# Patient Record
Sex: FEMALE | Race: WHITE | NOT HISPANIC OR LATINO | URBAN - NONMETROPOLITAN AREA
[De-identification: names, ages, dates, MRNs, and addresses within clinical notes are randomized per-mention and may not be internally consistent; named-entity substitution may affect disease eponyms.]

---

## 2019-02-08 ENCOUNTER — IMPORTED ENCOUNTER (OUTPATIENT)
Dept: URBAN - NONMETROPOLITAN AREA CLINIC 1 | Facility: CLINIC | Age: 64
End: 2019-02-08

## 2019-02-08 PROBLEM — H52.03: Noted: 2018-02-19

## 2019-02-08 PROBLEM — H52.4: Noted: 2018-02-19

## 2019-02-08 PROBLEM — H25.813: Noted: 2019-02-08

## 2019-02-08 PROBLEM — E11.9: Noted: 2018-02-19

## 2019-02-08 PROCEDURE — 99214 OFFICE O/P EST MOD 30 MIN: CPT

## 2019-02-08 NOTE — PATIENT DISCUSSION
Cataract(s)-Visually significant cataract OU.-Cataract(s) causing symptomatic impairment of visual function not correctable with a tolerable change in glasses or contact lenses lighting or non-operative means resulting in specific activity limitations and/or participation restrictions including but not limited to reading viewing television driving or meeting vocational or recreational needs. -Expectation is clearer vision and functional improvement in symptoms as well as reduced glare disability after cataract removal.-Order IOLMaster and OPD today. -Recommend Trad Sx w/ standard IOL based on today's OPD testing and lifestyle questionnaire.-All questions were answered regarding surgery including pre and post-op medications appointments activity restrictions and anesthetic usage.-The risks benefits and alternatives and special risk factors for the patient were discussed in detail including but not limited to: bleeding infection retinal detachment vitreous loss problems with the implant and possible need for additional surgery.-Although rare the possibility of complete vision loss was discussed.-The possible need for glasses post-operatively was discussed.-Order H&P.-Patient elects to proceed with cataract surgery OS.  Will schedule at patient's convenience and re-evaluate OD in the future.-Complex/Trypan OU

## 2019-02-27 PROBLEM — H52.03: Noted: 2019-02-27

## 2019-02-27 PROBLEM — H52.4: Noted: 2019-02-27

## 2019-02-27 PROBLEM — E11.9: Noted: 2019-02-27

## 2019-02-27 PROBLEM — H25.813: Noted: 2019-02-27

## 2019-03-04 ENCOUNTER — IMPORTED ENCOUNTER (OUTPATIENT)
Dept: URBAN - NONMETROPOLITAN AREA CLINIC 1 | Facility: CLINIC | Age: 64
End: 2019-03-04

## 2019-03-04 PROBLEM — E11.9: Noted: 2019-03-04

## 2019-03-04 PROBLEM — H25.813: Noted: 2019-03-04

## 2019-03-04 PROBLEM — Z01.818: Noted: 2019-03-04

## 2019-03-04 PROBLEM — E78.5: Noted: 2019-03-04

## 2019-03-04 PROBLEM — I10: Noted: 2019-03-04

## 2019-03-15 ENCOUNTER — IMPORTED ENCOUNTER (OUTPATIENT)
Dept: URBAN - NONMETROPOLITAN AREA CLINIC 1 | Facility: CLINIC | Age: 64
End: 2019-03-15

## 2019-03-15 PROBLEM — Z01.818: Noted: 2019-03-15

## 2019-03-15 PROBLEM — E11.9: Noted: 2019-03-15

## 2019-03-15 PROBLEM — Z98.42: Noted: 2019-03-15

## 2019-03-15 PROBLEM — I10: Noted: 2019-03-15

## 2019-03-15 PROBLEM — E78.5: Noted: 2019-03-15

## 2019-03-15 PROBLEM — H25.813: Noted: 2019-03-15

## 2019-03-15 NOTE — PATIENT DISCUSSION
s/p PCIOL Stand/Trad IOL OS 3/14/19 JS-Pt doing well s/p PCIOL. -Continue post-op gtts according to instruction sheet and sleep with eye shield over eye for 7 nights.-Avoid bending at the waist lifting anything over 5lbs and dirty or vladimir environments. -RTC 1 wk .

## 2019-03-20 ENCOUNTER — IMPORTED ENCOUNTER (OUTPATIENT)
Dept: URBAN - NONMETROPOLITAN AREA CLINIC 1 | Facility: CLINIC | Age: 64
End: 2019-03-20

## 2019-03-20 PROBLEM — I10: Noted: 2019-03-20

## 2019-03-20 PROBLEM — H25.811: Noted: 2019-03-20

## 2019-03-20 PROBLEM — H40.003: Noted: 2019-03-20

## 2019-03-20 PROBLEM — E78.5: Noted: 2019-03-20

## 2019-03-20 PROBLEM — E11.9: Noted: 2019-03-20

## 2019-03-20 PROBLEM — Z98.42: Noted: 2019-03-20

## 2019-03-20 PROCEDURE — 99213 OFFICE O/P EST LOW 20 MIN: CPT

## 2019-03-20 PROCEDURE — 99024 POSTOP FOLLOW-UP VISIT: CPT

## 2019-03-20 NOTE — PATIENT DISCUSSION
Cataract(s)-Visually significant cataract OD. -Cataract(s) causing symptomatic impairment of visual function not correctable with a tolerable change in glasses or contact lenses lighting or non-operative means resulting in specific activity limitations and/or participation restrictions including but not limited to reading viewing television driving or meeting vocational or recreational needs. -Expectation is clearer vision and functional improvement in symptoms as well as reduced glare disability after cataract removal.-All questions were answered regarding surgery including pre and post-op medications appointments activity restrictions and anesthetic usage.-The risks benefits and alternatives and special risk factors for the patient were discussed in detail including but not limited to: bleeding infection retinal detachment vitreous loss problems with the implant and possible need for additional surgery.-Although rare the possibility of complete vision loss was discussed.-The need for glasses post-operatively was discussed.-Patient elects to proceed with cataract surgery OD . Micheal Gonzalez s/p PCIOL Std/Trad PC IOL OS -  3/14/19-Pt doing well at 1 week s/p PCIOL. -Continue post-op gtts according to instruction sheet.-Okay to resume usual activites and d/c eye shield. Glaucoma Suspect-Based on C:D IOP-Appears stable at this time.-Continue to monitor with exams and testing.

## 2019-03-29 ENCOUNTER — IMPORTED ENCOUNTER (OUTPATIENT)
Dept: URBAN - NONMETROPOLITAN AREA CLINIC 1 | Facility: CLINIC | Age: 64
End: 2019-03-29

## 2019-03-29 PROBLEM — E11.9: Noted: 2019-03-29

## 2019-03-29 PROBLEM — Z98.41: Noted: 2019-03-29

## 2019-03-29 NOTE — PATIENT DISCUSSION
s/p PCIOL-Pt doing well s/p PCIOL. -Continue post-op gtts according to instruction sheet and sleep with eye shield over eye for 7 nights.-Avoid bending at the waist lifting anything over 5lbs and dirty or vladimir environments. -RTC .

## 2019-04-05 ENCOUNTER — IMPORTED ENCOUNTER (OUTPATIENT)
Dept: URBAN - NONMETROPOLITAN AREA CLINIC 1 | Facility: CLINIC | Age: 64
End: 2019-04-05

## 2019-04-05 PROBLEM — Z98.41: Noted: 2019-03-29

## 2019-04-05 PROBLEM — E11.9: Noted: 2019-04-05

## 2019-04-05 PROBLEM — Z98.42: Noted: 2019-04-05

## 2019-04-05 NOTE — PATIENT DISCUSSION
s/p PCIOL-Pt doing well at 1 week s/p PCIOL. -Continue post-op gtts according to instruction sheet.-Okay to resume usual activites and d/c eye shield.

## 2019-04-19 ENCOUNTER — IMPORTED ENCOUNTER (OUTPATIENT)
Dept: URBAN - NONMETROPOLITAN AREA CLINIC 1 | Facility: CLINIC | Age: 64
End: 2019-04-19

## 2022-04-09 ASSESSMENT — TONOMETRY
OS_IOP_MMHG: 16
OD_IOP_MMHG: 16
OS_IOP_MMHG: 17
OS_IOP_MMHG: 14
OD_IOP_MMHG: 17
OD_IOP_MMHG: 18
OS_IOP_MMHG: 17
OD_IOP_MMHG: 17
OD_IOP_MMHG: 17
OS_IOP_MMHG: 17

## 2022-04-09 ASSESSMENT — VISUAL ACUITY
OD_SC: 20/200
OD_CC: 20/400
OS_CC: 20/70
OD_PAM: 20/25
OS_CC: 20/50
OD_PAM: 20/25
OD_CC: 20/30
OS_PH: 20/40
OS_SC: 20/400
OS_AM: 20/30
OS_CC: 20/50
OD_CC: 20/70
OD_CC: 20/30
OS_PH: 20/60
OU_CC: J5
OS_CC: 20/30
OS_SC: 20/400